# Patient Record
(demographics unavailable — no encounter records)

---

## 2024-12-10 NOTE — DISCUSSION/SUMMARY
[FreeTextEntry1] : 67 year old P2 presenting for annual exam -Pelvic exam benign -Right ovarian cyst: Endo 5.6cm, LO cyst (Simple)- 6x5.3x4.1cm will monitor annually  -Depression Screen done: PHQ 2 - 0 -Requisition given for mammogram -Contraception: Menopause -f/u PRN

## 2024-12-10 NOTE — PHYSICAL EXAM
[Chaperone Present] : A chaperone was present in the examining room during all aspects of the physical examination [10639] : A chaperone was present during the pelvic exam. [Appropriately responsive] : appropriately responsive [Alert] : alert [No Acute Distress] : no acute distress [Soft] : soft [Non-tender] : non-tender [Non-distended] : non-distended [No HSM] : No HSM [No Lesions] : no lesions [No Mass] : no mass [Oriented x3] : oriented x3 [Examination Of The Breasts] : a normal appearance [No Masses] : no breast masses were palpable [Labia Majora] : normal [Labia Minora] : normal [Normal] : normal [Uterine Adnexae] : normal [FreeTextEntry2] : Martha Collazo

## 2024-12-10 NOTE — HISTORY OF PRESENT ILLNESS
[FreeTextEntry1] :  Patient is a 67 year old P2 presenting for an annual visit. LMP Menopause. She is feeling well and is without complaints. She denies vaginal itching, odor and discharge. Denies urinary urgency, frequency and dysuria. She is not currently sexually active.   OBHx: C/S x2 c/b Preeclampsia